# Patient Record
Sex: MALE | NOT HISPANIC OR LATINO | Employment: FULL TIME | URBAN - METROPOLITAN AREA
[De-identification: names, ages, dates, MRNs, and addresses within clinical notes are randomized per-mention and may not be internally consistent; named-entity substitution may affect disease eponyms.]

---

## 2024-03-14 ENCOUNTER — APPOINTMENT (OUTPATIENT)
Dept: NEUROSURGERY | Facility: CLINIC | Age: 69
End: 2024-03-14
Payer: COMMERCIAL

## 2024-03-19 ENCOUNTER — HOSPITAL ENCOUNTER (OUTPATIENT)
Dept: RADIOLOGY | Facility: CLINIC | Age: 69
Discharge: HOME | End: 2024-03-19
Payer: MEDICARE

## 2024-03-19 DIAGNOSIS — D32.9 BENIGN NEOPLASM OF MENINGES, UNSPECIFIED (MULTI): ICD-10-CM

## 2024-03-19 PROCEDURE — A9575 INJ GADOTERATE MEGLUMI 0.1ML: HCPCS

## 2024-03-19 PROCEDURE — 2550000001 HC RX 255 CONTRASTS

## 2024-03-19 PROCEDURE — 70553 MRI BRAIN STEM W/O & W/DYE: CPT | Performed by: RADIOLOGY

## 2024-03-19 PROCEDURE — 70553 MRI BRAIN STEM W/O & W/DYE: CPT

## 2024-03-19 RX ORDER — GADOTERATE MEGLUMINE 376.9 MG/ML
19 INJECTION INTRAVENOUS
Status: COMPLETED | OUTPATIENT
Start: 2024-03-19 | End: 2024-03-19

## 2024-03-19 RX ADMIN — GADOTERATE MEGLUMINE 19 ML: 376.9 INJECTION INTRAVENOUS at 13:52

## 2024-03-21 ENCOUNTER — APPOINTMENT (OUTPATIENT)
Dept: NEUROSURGERY | Facility: CLINIC | Age: 69
End: 2024-03-21
Payer: MEDICARE

## 2024-03-21 ENCOUNTER — APPOINTMENT (OUTPATIENT)
Dept: RADIOLOGY | Facility: HOSPITAL | Age: 69
End: 2024-03-21
Payer: MEDICARE

## 2024-03-26 ENCOUNTER — TELEMEDICINE (OUTPATIENT)
Dept: NEUROSURGERY | Facility: HOSPITAL | Age: 69
End: 2024-03-26
Payer: MEDICARE

## 2024-03-26 DIAGNOSIS — D32.9 MENINGIOMA (MULTI): Primary | ICD-10-CM

## 2024-03-26 PROCEDURE — 99214 OFFICE O/P EST MOD 30 MIN: CPT | Performed by: NEUROLOGICAL SURGERY

## 2024-03-26 PROCEDURE — 99214 OFFICE O/P EST MOD 30 MIN: CPT | Mod: 95 | Performed by: NEUROLOGICAL SURGERY

## 2024-03-26 NOTE — PROGRESS NOTES
[facility]  Neurosurgery    History of Present Illness    Juancho Carroll is a 68-year-old male with a PMH significant for HTN, HLD, CVA (expressive aphasia and R HH 2022), tobacco use, who presented to the ED at [facility] on November 18, 2022 with hypertension, headache, difficulty with comprehension and a homonymous right field defect. Radiological studies revealed recent ischemic injury and a meningioma with surrounding edema. Patient was seen by Dr. Darryl Chavez at that time who recommended consideration for surgical resection after recovery from his recent stroke and obtaining a short-term MRI.  His repeat imaging showed stability of the mass that was most consistent with a meningioma. It was decided that if fully recovered from stroke at that time, and if he could discontinue stroke prophylaxis for the perioperative period, consideration could be given to resection of the meningioma. Patient here today to discuss his recent MRI results that show an Extra-axial dural-based enhancing mass along the medial right parietal lobe measuring approximately 2.5 x 2.4 x 3.2 ccm, slightly increased in size from previous MRI 12/27/2022 which measured 2.2 cm in AP dimension and 2.3 cm in transverse dimension.    He reports no new symptoms.  He remains with visual field deficit following his stroke.    This was a virtual visit by telephone, to which the patient and his wife were on the phone consented.      Objective      Vitals:   There were no vitals taken for this visit.        Physical Exam:    N/a        Relevant Results:    MRI reviewed from 3/19/2024 and in comparison to prior MRI 12/22  There has been minimal growth of a right parasagittal parietal meningioma now measuring a little over 3 cm.  There appears to be an growth of approximately 2 mm in various dimensions.  There is no associated edema.  There is mild local brain compression, but no additional mass effect or shift.        Assessment & Plan       Diagnosis:  Diagnoses and all orders for this visit:  Meningioma (CMS/HCC)  -     MR brain w and wo IV contrast; Future  -     Creatinine; Future          Provider Impression:   We discussed the results of the imaging, and the options for continued observation given the lesion remains asymptomatic and the relatively small amount of growth.  However we also discussed that given the growth, treatment could be entertained.  Given its size exceeding approximately 3 cm, radiation would not be the recommended course of action to prevent further growth, and we would entertain surgical resection is the recommended course.  We discussed the risks and benefits in detail.      At this time he would like to continue observation with another MRI in 1 years time.    Total time for this visit including review of imaging was 30 minutes      Medical History     No past medical history on file.  No past surgical history on file.     No family history on file.  Not on File  No current outpatient medications

## 2024-11-19 ENCOUNTER — ANESTHESIA EVENT (OUTPATIENT)
Dept: GASTROENTEROLOGY | Facility: EXTERNAL LOCATION | Age: 69
End: 2024-11-19

## 2024-11-19 PROBLEM — D32.9 BENIGN NEOPLASM OF MENINGES: Status: ACTIVE | Noted: 2024-11-19

## 2024-11-19 PROBLEM — D18.01 HEMANGIOMA OF SKIN AND SUBCUTANEOUS TISSUE: Status: ACTIVE | Noted: 2017-11-30

## 2024-11-19 PROBLEM — G93.89 MASS OF BRAIN: Status: ACTIVE | Noted: 2024-11-19

## 2024-12-02 ENCOUNTER — APPOINTMENT (OUTPATIENT)
Dept: GASTROENTEROLOGY | Facility: EXTERNAL LOCATION | Age: 69
End: 2024-12-02
Payer: MEDICARE

## 2024-12-02 ENCOUNTER — ANESTHESIA (OUTPATIENT)
Dept: GASTROENTEROLOGY | Facility: EXTERNAL LOCATION | Age: 69
End: 2024-12-02

## 2024-12-02 VITALS
TEMPERATURE: 98.1 F | WEIGHT: 200 LBS | HEIGHT: 72 IN | BODY MASS INDEX: 27.09 KG/M2 | OXYGEN SATURATION: 98 % | SYSTOLIC BLOOD PRESSURE: 132 MMHG | RESPIRATION RATE: 17 BRPM | DIASTOLIC BLOOD PRESSURE: 73 MMHG | HEART RATE: 66 BPM

## 2024-12-02 DIAGNOSIS — Z12.11 ENCOUNTER FOR SCREENING FOR MALIGNANT NEOPLASM OF COLON: Primary | ICD-10-CM

## 2024-12-02 PROCEDURE — G0121 COLON CA SCRN NOT HI RSK IND: HCPCS | Performed by: INTERNAL MEDICINE

## 2024-12-02 RX ORDER — LISINOPRIL 10 MG/1
10 TABLET ORAL DAILY
COMMUNITY

## 2024-12-02 RX ORDER — ATORVASTATIN CALCIUM 40 MG/1
40 TABLET, FILM COATED ORAL DAILY
COMMUNITY

## 2024-12-02 RX ORDER — SODIUM CHLORIDE 9 MG/ML
INJECTION, SOLUTION INTRAVENOUS CONTINUOUS PRN
Status: DISCONTINUED | OUTPATIENT
Start: 2024-12-02 | End: 2024-12-02

## 2024-12-02 RX ORDER — AMLODIPINE BESYLATE 10 MG/1
10 TABLET ORAL DAILY
COMMUNITY

## 2024-12-02 RX ORDER — LIDOCAINE HYDROCHLORIDE 20 MG/ML
INJECTION, SOLUTION INFILTRATION; PERINEURAL AS NEEDED
Status: DISCONTINUED | OUTPATIENT
Start: 2024-12-02 | End: 2024-12-02

## 2024-12-02 RX ORDER — PROPOFOL 10 MG/ML
INJECTION, EMULSION INTRAVENOUS AS NEEDED
Status: DISCONTINUED | OUTPATIENT
Start: 2024-12-02 | End: 2024-12-02

## 2024-12-02 SDOH — HEALTH STABILITY: MENTAL HEALTH: CURRENT SMOKER: 0

## 2024-12-02 ASSESSMENT — PAIN - FUNCTIONAL ASSESSMENT
PAIN_FUNCTIONAL_ASSESSMENT: 0-10

## 2024-12-02 ASSESSMENT — PAIN SCALES - GENERAL
PAINLEVEL_OUTOF10: 0 - NO PAIN
PAIN_LEVEL: 0
PAINLEVEL_OUTOF10: 0 - NO PAIN

## 2024-12-02 ASSESSMENT — COLUMBIA-SUICIDE SEVERITY RATING SCALE - C-SSRS
1. IN THE PAST MONTH, HAVE YOU WISHED YOU WERE DEAD OR WISHED YOU COULD GO TO SLEEP AND NOT WAKE UP?: NO
2. HAVE YOU ACTUALLY HAD ANY THOUGHTS OF KILLING YOURSELF?: NO
6. HAVE YOU EVER DONE ANYTHING, STARTED TO DO ANYTHING, OR PREPARED TO DO ANYTHING TO END YOUR LIFE?: NO

## 2024-12-02 NOTE — DISCHARGE INSTRUCTIONS

## 2024-12-02 NOTE — H&P
Outpatient NR Procedure H&P    Patient Profile  Name Juancho Carroll  Date of Birth 1955  MRN [id number]  PCP Brian Alvarenga        Diagnosis: screening colon  Procedure(s): Colonoscopy       Allergies  No Known Allergies    Past Medical History   History reviewed. No pertinent past medical history.    Medication Reviewed - yes  Prior to Admission medications    Not on File       Physical Exam  There were no vitals filed for this visit.   Weight There were no vitals filed for this visit.  BMI There is no height or weight on file to calculate BMI.    General: A&Ox3, NAD.  HEENT: AT/NC.   CV: RRR. No murmur.  Resp: CTA bilaterally. No wheezing, rhonchi or rales.   GI: Soft, NT/ND. BSx4.  Extrem: No edema. Pulses intact.  Skin: No Jaundice.   Neuro: No focal deficits.   Psych: Normal mood and affect.        Oropharyngeal Classification I (soft palate, uvula, fauces, and tonsillar pillars visible)  ASA PS Classification 2  Sedation Plan: Deep Sedation.  Procedure Plan - pre-procedural (re)assesment completed by physician:  discharge/transfer patient when discharge criteria met    Paul Cruz MD  12/2/2024 12:25 PM

## 2024-12-02 NOTE — ANESTHESIA POSTPROCEDURE EVALUATION
Patient: Juancho Carroll    Procedure Summary       Date: 12/02/24 Room / Location: [facility]    Anesthesia Start: 1253 Anesthesia Stop:     Procedure: COLONOSCOPY Diagnosis:       Encounter for screening for malignant neoplasm of colon      Encounter for screening for malignant neoplasm of colon    Scheduled Providers: Paul Cruz MD Responsible Provider: MUKESH Heath    Anesthesia Type: MAC ASA Status: 2            Anesthesia Type: MAC    Vitals Value Taken Time   BP 97/62 12/02/24 1318   Temp 36.8 12/02/24 1318   Pulse 77 12/02/24 1318   Resp 10 12/02/24 1318   SpO2 97 12/02/24 1318       Anesthesia Post Evaluation    Patient location during evaluation: bedside  Patient participation: complete - patient cannot participate  Level of consciousness: awake and responsive to verbal stimuli  Pain score: 0  Pain management: adequate  Airway patency: patent  Cardiovascular status: acceptable and hemodynamically stable  Respiratory status: acceptable  Hydration status: acceptable  Postoperative Nausea and Vomiting: none        No notable events documented.

## 2024-12-02 NOTE — ANESTHESIA PREPROCEDURE EVALUATION
Patient: Juancho Carroll    Procedure Information       Date/Time: 12/02/24 1300    Scheduled providers: Paul Cruz MD    Procedure: COLONOSCOPY    Location: [facility]            Relevant Problems   Cardiac   (+) Hyperlipidemia       Clinical information reviewed:   Tobacco  Allergies  Meds  Problems  Med Hx  Surg Hx   Fam Hx  Soc   Hx        NPO Detail:  NPO/Void Status  Date of Last Liquid: 12/02/24  Time of Last Liquid: 0100  Date of Last Solid: 11/30/24  Last Intake Type: Clear fluids         Physical Exam    Airway  Mallampati: II  TM distance: >3 FB  Neck ROM: full     Cardiovascular - normal exam     Dental - normal exam     Pulmonary - normal exam  Breath sounds clear to auscultation     Abdominal            Anesthesia Plan    History of general anesthesia?: yes  History of complications of general anesthesia?: no    ASA 2     MAC     The patient is not a current smoker.    intravenous induction   Anesthetic plan and risks discussed with patient.    Plan discussed with CRNA.

## 2025-07-30 DIAGNOSIS — D32.9 MENINGIOMA (MULTI): Primary | ICD-10-CM

## 2025-08-01 ENCOUNTER — HOSPITAL ENCOUNTER (OUTPATIENT)
Dept: RADIOLOGY | Facility: CLINIC | Age: 70
Discharge: HOME | End: 2025-08-01
Payer: MEDICARE

## 2025-08-01 DIAGNOSIS — D32.9 MENINGIOMA (MULTI): ICD-10-CM

## 2025-08-01 PROCEDURE — A9575 INJ GADOTERATE MEGLUMI 0.1ML: HCPCS | Mod: JW | Performed by: NURSE PRACTITIONER

## 2025-08-01 PROCEDURE — 2550000001 HC RX 255 CONTRASTS: Mod: JW | Performed by: NURSE PRACTITIONER

## 2025-08-01 PROCEDURE — 70553 MRI BRAIN STEM W/O & W/DYE: CPT

## 2025-08-01 RX ORDER — GADOTERATE MEGLUMINE 376.9 MG/ML
0.2 INJECTION INTRAVENOUS
Status: COMPLETED | OUTPATIENT
Start: 2025-08-01 | End: 2025-08-01

## 2025-08-01 RX ADMIN — GADOTERATE MEGLUMINE 18 ML: 376.9 INJECTION INTRAVENOUS at 13:50

## 2025-08-26 ENCOUNTER — OFFICE VISIT (OUTPATIENT)
Dept: NEUROSURGERY | Facility: HOSPITAL | Age: 70
End: 2025-08-26
Payer: MEDICARE

## 2025-08-26 VITALS
HEART RATE: 76 BPM | HEIGHT: 72 IN | SYSTOLIC BLOOD PRESSURE: 128 MMHG | BODY MASS INDEX: 27.09 KG/M2 | WEIGHT: 200 LBS | DIASTOLIC BLOOD PRESSURE: 74 MMHG | RESPIRATION RATE: 18 BRPM

## 2025-08-26 DIAGNOSIS — D32.9 MENINGIOMA (MULTI): Primary | ICD-10-CM

## 2025-08-26 PROCEDURE — 99212 OFFICE O/P EST SF 10 MIN: CPT

## 2025-08-26 PROCEDURE — 1159F MED LIST DOCD IN RCRD: CPT | Performed by: NEUROLOGICAL SURGERY

## 2025-08-26 PROCEDURE — 3008F BODY MASS INDEX DOCD: CPT | Performed by: NEUROLOGICAL SURGERY

## 2025-08-26 PROCEDURE — 99215 OFFICE O/P EST HI 40 MIN: CPT | Performed by: NEUROLOGICAL SURGERY

## 2025-08-26 PROCEDURE — 99202 OFFICE O/P NEW SF 15 MIN: CPT
